# Patient Record
Sex: MALE | Race: WHITE | NOT HISPANIC OR LATINO | Employment: FULL TIME | URBAN - METROPOLITAN AREA
[De-identification: names, ages, dates, MRNs, and addresses within clinical notes are randomized per-mention and may not be internally consistent; named-entity substitution may affect disease eponyms.]

---

## 2018-02-25 ENCOUNTER — OFFICE VISIT (OUTPATIENT)
Dept: URGENT CARE | Facility: PHYSICIAN GROUP | Age: 28
End: 2018-02-25
Payer: COMMERCIAL

## 2018-02-25 VITALS
HEIGHT: 74 IN | SYSTOLIC BLOOD PRESSURE: 128 MMHG | OXYGEN SATURATION: 97 % | TEMPERATURE: 98 F | RESPIRATION RATE: 14 BRPM | BODY MASS INDEX: 40.43 KG/M2 | DIASTOLIC BLOOD PRESSURE: 88 MMHG | HEART RATE: 87 BPM | WEIGHT: 315 LBS

## 2018-02-25 DIAGNOSIS — L03.211 FACIAL CELLULITIS: ICD-10-CM

## 2018-02-25 PROCEDURE — 99204 OFFICE O/P NEW MOD 45 MIN: CPT | Performed by: PHYSICIAN ASSISTANT

## 2018-02-25 RX ORDER — DOXYCYCLINE HYCLATE 100 MG
100 TABLET ORAL 2 TIMES DAILY
Qty: 14 TAB | Refills: 0 | Status: SHIPPED | OUTPATIENT
Start: 2018-02-25 | End: 2018-03-04

## 2018-02-25 ASSESSMENT — ENCOUNTER SYMPTOMS
NAUSEA: 0
VOMITING: 0
CHANGE IN BOWEL HABIT: 0
SENSORY CHANGE: 0
FACIAL SWELLING: 1
FATIGUE: 0
FOCAL WEAKNESS: 0
PALPITATIONS: 0
SHORTNESS OF BREATH: 0
VISUAL CHANGE: 0
SORE THROAT: 0
JOINT SWELLING: 0
NUMBNESS: 0
FEVER: 0
ABDOMINAL PAIN: 0
TINGLING: 0
COUGH: 0
SINUS PAIN: 0
MYALGIAS: 0
CHILLS: 0
HEADACHES: 0

## 2018-02-25 NOTE — PROGRESS NOTES
"Subjective:      Arpan Goncalves is a 27 y.o. male who presents with Facial Swelling (pressure swelling on the r side )            Facial Swelling   This is a new problem. The current episode started today (patient woke up with right maxillary swelling). The problem occurs constantly. The problem has been unchanged. Pertinent negatives include no abdominal pain, change in bowel habit, chest pain, chills, congestion, coughing, fatigue, fever, headaches, joint swelling, myalgias, nausea, numbness, rash, sore throat, visual change or vomiting. Nothing aggravates the symptoms. He has tried nothing for the symptoms.     History reviewed. No pertinent past medical history.  History reviewed. No pertinent surgical history.    History reviewed. No pertinent family history.    No Known Allergies    Medications, Allergies, and current problem list reviewed today in Epic      Review of Systems   Constitutional: Negative for chills, fatigue, fever and malaise/fatigue.   HENT: Positive for facial swelling. Negative for congestion, ear discharge, ear pain, sinus pain and sore throat.         No tooth pain    Respiratory: Negative for cough and shortness of breath.    Cardiovascular: Negative for chest pain, palpitations and leg swelling.   Gastrointestinal: Negative for abdominal pain, change in bowel habit, nausea and vomiting.   Musculoskeletal: Negative for joint swelling and myalgias.   Skin: Negative for rash.   Neurological: Negative for tingling, sensory change, focal weakness, numbness and headaches.     All other systems reviewed and are negative.        Objective:     /88   Pulse 87   Temp 36.7 °C (98 °F)   Resp 14   Ht 1.88 m (6' 2\")   Wt (!) 152.9 kg (337 lb)   SpO2 97%   BMI 43.27 kg/m²      Physical Exam   Constitutional: He is oriented to person, place, and time. He appears well-developed and well-nourished. No distress.   HENT:   Head: Normocephalic and atraumatic.       Right Ear: Tympanic " membrane, external ear and ear canal normal.   Left Ear: Tympanic membrane, external ear and ear canal normal.   Nose: Right sinus exhibits no maxillary sinus tenderness. Left sinus exhibits no maxillary sinus tenderness.   Mouth/Throat: Uvula is midline, oropharynx is clear and moist and mucous membranes are normal.   dilapidated dentition- multiple caries but no signs of abscess.   Neck: Neck supple.   Cardiovascular: Normal rate, regular rhythm and normal heart sounds.  Exam reveals no gallop and no friction rub.    No murmur heard.  Pulmonary/Chest: Effort normal and breath sounds normal. No respiratory distress. He has no wheezes. He has no rales.   Lymphadenopathy:     He has no cervical adenopathy.   Neurological: He is alert and oriented to person, place, and time. No cranial nerve deficit.   Psychiatric: He has a normal mood and affect. His behavior is normal. Judgment and thought content normal.               Assessment/Plan:     1. Facial cellulitis  - encouraged Ibuprofen or trial of Benadryl if symptoms worsen.  - doxycycline (VIBRAMYCIN) 100 MG Tab; Take 1 Tab by mouth 2 times a day for 7 days.  Dispense: 14 Tab; Refill: 0    Follow-up if any worsening or fever.     Differential diagnoses, Supportive care, and indications for immediate follow-up discussed with patient.   Instructed to return to clinic or nearest emergency department for any change in condition, further concerns, or worsening of symptoms.    The patient demonstrated a good understanding and agreed with the treatment plan.    Miriam Pearl P.A.-C.

## 2021-07-02 ENCOUNTER — TELEPHONE (OUTPATIENT)
Dept: SCHEDULING | Facility: IMAGING CENTER | Age: 31
End: 2021-07-02

## 2021-07-08 SDOH — ECONOMIC STABILITY: HOUSING INSECURITY
IN THE LAST 12 MONTHS, WAS THERE A TIME WHEN YOU DID NOT HAVE A STEADY PLACE TO SLEEP OR SLEPT IN A SHELTER (INCLUDING NOW)?: NO

## 2021-07-08 SDOH — HEALTH STABILITY: PHYSICAL HEALTH: ON AVERAGE, HOW MANY MINUTES DO YOU ENGAGE IN EXERCISE AT THIS LEVEL?: 60 MIN

## 2021-07-08 SDOH — ECONOMIC STABILITY: TRANSPORTATION INSECURITY
IN THE PAST 12 MONTHS, HAS LACK OF TRANSPORTATION KEPT YOU FROM MEETINGS, WORK, OR FROM GETTING THINGS NEEDED FOR DAILY LIVING?: NO

## 2021-07-08 SDOH — HEALTH STABILITY: PHYSICAL HEALTH: ON AVERAGE, HOW MANY DAYS PER WEEK DO YOU ENGAGE IN MODERATE TO STRENUOUS EXERCISE (LIKE A BRISK WALK)?: 4 DAYS

## 2021-07-08 SDOH — ECONOMIC STABILITY: TRANSPORTATION INSECURITY
IN THE PAST 12 MONTHS, HAS THE LACK OF TRANSPORTATION KEPT YOU FROM MEDICAL APPOINTMENTS OR FROM GETTING MEDICATIONS?: NO

## 2021-07-08 SDOH — ECONOMIC STABILITY: FOOD INSECURITY: WITHIN THE PAST 12 MONTHS, THE FOOD YOU BOUGHT JUST DIDN'T LAST AND YOU DIDN'T HAVE MONEY TO GET MORE.: NEVER TRUE

## 2021-07-08 SDOH — ECONOMIC STABILITY: INCOME INSECURITY: HOW HARD IS IT FOR YOU TO PAY FOR THE VERY BASICS LIKE FOOD, HOUSING, MEDICAL CARE, AND HEATING?: NOT HARD AT ALL

## 2021-07-08 SDOH — ECONOMIC STABILITY: FOOD INSECURITY: WITHIN THE PAST 12 MONTHS, YOU WORRIED THAT YOUR FOOD WOULD RUN OUT BEFORE YOU GOT MONEY TO BUY MORE.: NEVER TRUE

## 2021-07-08 SDOH — ECONOMIC STABILITY: HOUSING INSECURITY: IN THE LAST 12 MONTHS, HOW MANY PLACES HAVE YOU LIVED?: 2

## 2021-07-08 SDOH — ECONOMIC STABILITY: INCOME INSECURITY: IN THE LAST 12 MONTHS, WAS THERE A TIME WHEN YOU WERE NOT ABLE TO PAY THE MORTGAGE OR RENT ON TIME?: NO

## 2021-07-08 SDOH — HEALTH STABILITY: MENTAL HEALTH
STRESS IS WHEN SOMEONE FEELS TENSE, NERVOUS, ANXIOUS, OR CAN'T SLEEP AT NIGHT BECAUSE THEIR MIND IS TROUBLED. HOW STRESSED ARE YOU?: NOT AT ALL

## 2021-07-08 SDOH — ECONOMIC STABILITY: TRANSPORTATION INSECURITY
IN THE PAST 12 MONTHS, HAS LACK OF RELIABLE TRANSPORTATION KEPT YOU FROM MEDICAL APPOINTMENTS, MEETINGS, WORK OR FROM GETTING THINGS NEEDED FOR DAILY LIVING?: NO

## 2021-07-08 ASSESSMENT — SOCIAL DETERMINANTS OF HEALTH (SDOH)
HOW OFTEN DO YOU ATTENT MEETINGS OF THE CLUB OR ORGANIZATION YOU BELONG TO?: NEVER
HOW OFTEN DO YOU ATTEND CHURCH OR RELIGIOUS SERVICES?: NEVER
HOW OFTEN DO YOU ATTENT MEETINGS OF THE CLUB OR ORGANIZATION YOU BELONG TO?: NEVER
IN A TYPICAL WEEK, HOW MANY TIMES DO YOU TALK ON THE PHONE WITH FAMILY, FRIENDS, OR NEIGHBORS?: MORE THAN THREE TIMES A WEEK
HOW MANY DRINKS CONTAINING ALCOHOL DO YOU HAVE ON A TYPICAL DAY WHEN YOU ARE DRINKING: 1 OR 2
HOW HARD IS IT FOR YOU TO PAY FOR THE VERY BASICS LIKE FOOD, HOUSING, MEDICAL CARE, AND HEATING?: NOT HARD AT ALL
ARE YOU MARRIED, WIDOWED, DIVORCED, SEPARATED, NEVER MARRIED, OR LIVING WITH A PARTNER?: NEVER MARRIED
IN A TYPICAL WEEK, HOW MANY TIMES DO YOU TALK ON THE PHONE WITH FAMILY, FRIENDS, OR NEIGHBORS?: MORE THAN THREE TIMES A WEEK
DO YOU BELONG TO ANY CLUBS OR ORGANIZATIONS SUCH AS CHURCH GROUPS UNIONS, FRATERNAL OR ATHLETIC GROUPS, OR SCHOOL GROUPS?: NO
DO YOU BELONG TO ANY CLUBS OR ORGANIZATIONS SUCH AS CHURCH GROUPS UNIONS, FRATERNAL OR ATHLETIC GROUPS, OR SCHOOL GROUPS?: NO
WITHIN THE PAST 12 MONTHS, YOU WORRIED THAT YOUR FOOD WOULD RUN OUT BEFORE YOU GOT THE MONEY TO BUY MORE: NEVER TRUE
HOW OFTEN DO YOU HAVE SIX OR MORE DRINKS ON ONE OCCASION: LESS THAN MONTHLY
HOW OFTEN DO YOU HAVE A DRINK CONTAINING ALCOHOL: 2-3 TIMES A WEEK
HOW OFTEN DO YOU GET TOGETHER WITH FRIENDS OR RELATIVES?: THREE TIMES A WEEK
HOW OFTEN DO YOU ATTEND CHURCH OR RELIGIOUS SERVICES?: NEVER
HOW OFTEN DO YOU GET TOGETHER WITH FRIENDS OR RELATIVES?: THREE TIMES A WEEK
ARE YOU MARRIED, WIDOWED, DIVORCED, SEPARATED, NEVER MARRIED, OR LIVING WITH A PARTNER?: NEVER MARRIED

## 2021-07-08 ASSESSMENT — LIFESTYLE VARIABLES
HOW OFTEN DO YOU HAVE SIX OR MORE DRINKS ON ONE OCCASION: LESS THAN MONTHLY
HOW OFTEN DO YOU HAVE A DRINK CONTAINING ALCOHOL: 2-3 TIMES A WEEK
HOW MANY STANDARD DRINKS CONTAINING ALCOHOL DO YOU HAVE ON A TYPICAL DAY: 1 OR 2

## 2021-07-09 ENCOUNTER — OFFICE VISIT (OUTPATIENT)
Dept: MEDICAL GROUP | Facility: IMAGING CENTER | Age: 31
End: 2021-07-09
Payer: COMMERCIAL

## 2021-07-09 VITALS
BODY MASS INDEX: 40.43 KG/M2 | OXYGEN SATURATION: 99 % | HEART RATE: 89 BPM | RESPIRATION RATE: 16 BRPM | SYSTOLIC BLOOD PRESSURE: 128 MMHG | HEIGHT: 74 IN | TEMPERATURE: 96.5 F | WEIGHT: 315 LBS | DIASTOLIC BLOOD PRESSURE: 84 MMHG

## 2021-07-09 DIAGNOSIS — Z13.220 SCREENING CHOLESTEROL LEVEL: ICD-10-CM

## 2021-07-09 DIAGNOSIS — Z00.00 WELLNESS EXAMINATION: ICD-10-CM

## 2021-07-09 DIAGNOSIS — R63.8 UNABLE TO LOSE WEIGHT: ICD-10-CM

## 2021-07-09 DIAGNOSIS — Z79.899 ON PRE-EXPOSURE PROPHYLAXIS FOR HIV: ICD-10-CM

## 2021-07-09 DIAGNOSIS — R79.89 ELEVATED LIVER FUNCTION TESTS: ICD-10-CM

## 2021-07-09 PROCEDURE — 99385 PREV VISIT NEW AGE 18-39: CPT | Performed by: PHYSICIAN ASSISTANT

## 2021-07-09 RX ORDER — EMTRICITABINE AND TENOFOVIR DISOPROXIL FUMARATE 200; 300 MG/1; MG/1
1 TABLET, FILM COATED ORAL DAILY
COMMUNITY
Start: 2021-06-06 | End: 2021-09-09 | Stop reason: SDUPTHER

## 2021-07-09 ASSESSMENT — PATIENT HEALTH QUESTIONNAIRE - PHQ9: CLINICAL INTERPRETATION OF PHQ2 SCORE: 0

## 2021-07-09 ASSESSMENT — PAIN SCALES - GENERAL: PAINLEVEL: NO PAIN

## 2021-07-09 NOTE — ASSESSMENT & PLAN NOTE
Patient admits to difficulty losing weight.  She states he has been overweight his entire life.  He has tried multiple different diets including keto, low-carb, intermittent fasting, food logging with calories and calories out.

## 2021-07-09 NOTE — ASSESSMENT & PLAN NOTE
Noted in previous labs.  Patient states that he was told he likely has fatty liver.  He has never had an ultrasound.  He is not drink alcohol excessively.  He has around 3 drinks a week mostly on the weekends.

## 2021-07-09 NOTE — PATIENT INSTRUCTIONS
Once resulted, your lab/test/imaging results will show up automatically in your MyChart. Please wait for my interpretation and recommendations prior to viewing your results to avoid any unnecessary confusion or misinterpretation. I will address all of the lab values that I interpret as abnormal and message you accordingly on your MyChart. I will always send you a message on your labs even if they are normal. If you do not hear back from me within 5 business days after getting your labs drawn, then please send me a message on MyChart so I can obtain your labs (especially if you went to an outside lab - [facility], [facility], etc). If you have any additional questions or concerns beyond my interpretation of your results, please make an appointment with me to discuss in further detail.    Please only use the [employer] messaging system for questions regarding your most recent appointment or if advised to use otherwise (glucose or blood pressure reporting). If you have any new problems or concerns, you must make an appointment to discuss. This includes any referral requests.     Thank you,    Patricia Mercer PA-C ([place])  Physician Assistant Certified  [facility]

## 2021-07-09 NOTE — ASSESSMENT & PLAN NOTE
Patient on Truvada for the past month.  [facility] and [facility] labs including full STD panel and CMP.  Uses condoms during every sexual interaction.

## 2021-07-09 NOTE — PROGRESS NOTES
Subjective:     CC:    Chief Complaint   Patient presents with   • Establish Care       HISTORY OF THE PRESENT ILLNESS: Patient is a 30 y.o. male here to establish care.    On pre-exposure prophylaxis for HIV  Patient on Truvada for the past month.  He was prescribed at [facility] and had labs including full STD panel and CMP.  Uses condoms during every sexual interaction.    Elevated liver function tests  Noted in previous labs.  Patient states that he was told he likely has fatty liver.  He has never had an ultrasound.  He is not drink alcohol excessively.  He has around 3 drinks a week mostly on the weekends.    BMI 45.0-49.9, adult (HCC)  Patient admits to difficulty losing weight.  She states he has been overweight his entire life.  He has tried multiple different diets including keto, low-carb, intermittent fasting, food logging with calories and calories out.      Depression Screening    Little interest or pleasure in doing things?  0 - not at all  Feeling down, depressed , or hopeless? 0 - not at all  Patient Health Questionnaire Score: 0    Allergies:   Patient has no known allergies.  Current Outpatient Medications Ordered in Epic   Medication Sig Dispense Refill   • emtricitabine-tenofovir (TRUVADA) 200-300 MG per tablet Take 1 tablet by mouth every day.       No current [facility]-ordered facility-administered medications on file.     History reviewed. No pertinent past medical history.  History reviewed. No pertinent surgical history.  Social History     Tobacco Use   • Smoking status: Never Smoker   • Smokeless tobacco: Never Used   Vaping Use   • Vaping Use: Never used   Substance Use Topics   • Alcohol use: Yes     Alcohol/week: 1.8 - 2.4 oz     Types: 3 - 4 Standard drinks or equivalent per week     Comment: occ   • Drug use: No     Social History     Social History Narrative   • Not on file     Family History   Problem Relation Age of Onset   • Cancer Maternal Grandfather         colon cancer  "      Health Maintenance/Screening  Reviewed.  Tdap next visit per patient request.     ROS:   Gen: no fevers/chills, no changes in weight  Eyes: no changes in vision  ENT: no sore throat, no hearing loss, no bloody nose  Pulm: no sob, no cough  CV: no chest pain, no palpitations  GI: no nausea/vomiting, no diarrhea  : no dysuria or hematuria  MSk: no myalgias  Skin: no rash  Neuro: no headaches, no numbness/tingling  Heme/Lymph: no easy bruising      Objective:     Exam: /84 (BP Location: Left arm, Patient Position: Sitting, BP Cuff Size: Adult)   Pulse 89   Temp 35.8 °C (96.5 °F) (Temporal)   Resp 16   Ht 1.88 m (6' 2\")   Wt (!) 166 kg (365 lb)   SpO2 99%  Body mass index is 46.86 kg/m².  General: Normal appearing. No distress.  HEENT: Normocephalic. Eyes conjunctiva clear lids without ptosis, pupils equal.  Neck: Supple without JVD or bruit. Thyroid is not enlarged.  Pulmonary: Clear to ausculation.  Normal effort. No rales, ronchi, or wheezing.  Cardiovascular: Regular rate and rhythm without murmur. Carotid and radial pulses are intact and equal bilaterally.  Abdomen: Soft, nontender, nondistended. Normal bowel sounds. Liver and spleen are not palpable.  Obese.  Neurologic: Grossly nonfocal  Lymph: No cervical or supraclavicular lymph nodes are palpable  Skin: Warm and dry.  No obvious lesions.  Musculoskeletal: Normal gait. No extremity cyanosis, clubbing, or edema.  Psych: Normal mood and affect. Alert and oriented x3. Judgment and insight is normal.    Assessment & Plan:   30 y.o. male with the following -    1. Wellness examination  Pleasant 22-year-old male here for annual wellness visit.  History reviewed.  Tdap next visit per patient request.  Previous labs reviewed.    2. On pre-exposure prophylaxis for HIV  Patient does not need refill today.  STD screening and creatinine up-to-date.    3. BMI 45.0-49.9, adult (HCC)  - Patient identified as having weight management issue.  Appropriate " orders and counseling given.  - REFERRAL TO [facility] IMPROVEMENT PROGRAMS (HIP); Future  - Lipid Profile; Future  - TSH; Future  - FREE THYROXINE; Future    4. Elevated liver function tests  Likely related to fatty liver.  Check ultrasound.  Recheck hepatic function panel next visit.  - US-ABDOMEN COMPLETE SURVEY; Future  - Lipid Profile; Future    5. Screening cholesterol level  - Lipid Profile; Future    6. Unable to lose weight  - TSH; Future  - FREE THYROXINE; Future    Return in about 2 months (around 9/9/2021) for screening labs, refill.    Patricia Mercer PA-C ([place])  Physician Assistant Certified  [facility]    Please note that this dictation was created using voice recognition software. I have made every reasonable attempt to correct obvious errors, but I expect that there are errors of grammar and possibly content that I did not discover before finalizing the note.

## 2021-07-16 ENCOUNTER — HOSPITAL ENCOUNTER (OUTPATIENT)
Dept: LAB | Facility: MEDICAL CENTER | Age: 31
End: 2021-07-16
Attending: PHYSICIAN ASSISTANT
Payer: COMMERCIAL

## 2021-07-16 DIAGNOSIS — R63.8 UNABLE TO LOSE WEIGHT: ICD-10-CM

## 2021-07-16 DIAGNOSIS — R79.89 ELEVATED LIVER FUNCTION TESTS: ICD-10-CM

## 2021-07-16 DIAGNOSIS — Z13.220 SCREENING CHOLESTEROL LEVEL: ICD-10-CM

## 2021-07-16 LAB
CHOLEST SERPL-MCNC: 132 MG/DL (ref 100–199)
FASTING STATUS PATIENT QL REPORTED: NORMAL
HDLC SERPL-MCNC: 35 MG/DL
LDLC SERPL CALC-MCNC: 71 MG/DL
T4 FREE SERPL-MCNC: 1.13 NG/DL (ref 0.93–1.7)
TRIGL SERPL-MCNC: 128 MG/DL (ref 0–149)
TSH SERPL DL<=0.005 MIU/L-ACNC: 2.12 UIU/ML (ref 0.38–5.33)

## 2021-07-16 PROCEDURE — 36415 COLL VENOUS BLD VENIPUNCTURE: CPT

## 2021-07-16 PROCEDURE — 84443 ASSAY THYROID STIM HORMONE: CPT

## 2021-07-16 PROCEDURE — 80061 LIPID PANEL: CPT

## 2021-07-16 PROCEDURE — 84439 ASSAY OF FREE THYROXINE: CPT

## 2021-09-09 ENCOUNTER — OFFICE VISIT (OUTPATIENT)
Dept: MEDICAL GROUP | Facility: IMAGING CENTER | Age: 31
End: 2021-09-09
Payer: COMMERCIAL

## 2021-09-09 VITALS
OXYGEN SATURATION: 96 % | SYSTOLIC BLOOD PRESSURE: 118 MMHG | WEIGHT: 315 LBS | TEMPERATURE: 97 F | DIASTOLIC BLOOD PRESSURE: 74 MMHG | HEART RATE: 91 BPM | RESPIRATION RATE: 14 BRPM | BODY MASS INDEX: 40.43 KG/M2 | HEIGHT: 74 IN

## 2021-09-09 DIAGNOSIS — Z23 NEED FOR VACCINATION: ICD-10-CM

## 2021-09-09 DIAGNOSIS — Z11.3 SCREENING EXAMINATION FOR SEXUALLY TRANSMITTED DISEASE: ICD-10-CM

## 2021-09-09 DIAGNOSIS — R63.4 WEIGHT LOSS: ICD-10-CM

## 2021-09-09 DIAGNOSIS — Z76.0 MEDICATION REFILL: ICD-10-CM

## 2021-09-09 DIAGNOSIS — R79.89 ELEVATED LIVER FUNCTION TESTS: ICD-10-CM

## 2021-09-09 DIAGNOSIS — Z79.899 ON PRE-EXPOSURE PROPHYLAXIS FOR HIV: ICD-10-CM

## 2021-09-09 PROCEDURE — 99214 OFFICE O/P EST MOD 30 MIN: CPT | Mod: 25 | Performed by: PHYSICIAN ASSISTANT

## 2021-09-09 PROCEDURE — 90471 IMMUNIZATION ADMIN: CPT | Performed by: PHYSICIAN ASSISTANT

## 2021-09-09 PROCEDURE — 90715 TDAP VACCINE 7 YRS/> IM: CPT | Performed by: PHYSICIAN ASSISTANT

## 2021-09-09 RX ORDER — EMTRICITABINE AND TENOFOVIR DISOPROXIL FUMARATE 200; 300 MG/1; MG/1
1 TABLET, FILM COATED ORAL DAILY
Qty: 90 TABLET | Refills: 0 | Status: SHIPPED | OUTPATIENT
Start: 2021-09-09 | End: 2021-12-02

## 2021-09-09 NOTE — PATIENT INSTRUCTIONS
Once resulted, your lab/test/imaging results will show up automatically in your MyChart. Please wait for my interpretation and recommendations prior to viewing your results to avoid any unnecessary confusion or misinterpretation. I will address all of the lab values that I interpret as abnormal and message you accordingly on your MyChart. I will always send you a message on your labs even if they are normal. If you do not hear back from me within 5 business days after getting your labs drawn, then please send me a message on MyChart so I can obtain your labs (especially if you went to an outside lab - [facility], [facility], etc). If you have any additional questions or concerns beyond my interpretation of your results, please make an appointment with me to discuss in further detail.    Please only use the [employer] messaging system for questions regarding your most recent appointment or if advised to use otherwise (glucose or blood pressure reporting). If you have any new problems or concerns, you must make an appointment to discuss. This includes any referral requests.     Thank you,    Patricia Mercer PA-C ([place])  Physician Assistant Certified  [facility]

## 2021-09-09 NOTE — ASSESSMENT & PLAN NOTE
Tolerating Truvada.  Patient not currently sexually active.  When he is he wears condoms.  Needs a refill today and updated labs.

## 2021-09-09 NOTE — ASSESSMENT & PLAN NOTE
Intentional weight loss of 16 pounds since last visit 2 months ago.  Patient states that he has been following a meal plan of 1500 marge a day.  He has been working out 5 to 6 days a week.  He has been losing around 2 pounds a week.  He has not met with the nutritionist yet but plans on making an appointment.

## 2021-09-09 NOTE — PROGRESS NOTES
"Subjective:     CC:   Chief Complaint   Patient presents with   • Follow-Up     2 months.    • Lab Results       HPI:   Arpan presents today to follow-up on:    Weight loss  Intentional weight loss of 16 pounds since last visit 2 months ago.  Patient states that he has been following a meal plan of 1500 marge a day.  He has been working out 5 to 6 days a week.  He has been losing around 2 pounds a week.  He has not met with the nutritionist yet but plans on making an appointment.    On pre-exposure prophylaxis for HIV  Tolerating Truvada.  Patient not currently sexually active.  When he is he wears condoms.  Needs a refill today and updated labs.      History reviewed. No pertinent past medical history.  Social History     Tobacco Use   • Smoking status: Never Smoker   • Smokeless tobacco: Never Used   Vaping Use   • Vaping Use: Never used   Substance Use Topics   • Alcohol use: Yes     Alcohol/week: 1.8 - 2.4 oz     Types: 3 - 4 Standard drinks or equivalent per week     Comment: occ   • Drug use: No     Current Outpatient Medications Ordered in Epic   Medication Sig Dispense Refill   • emtricitabine-tenofovir (TRUVADA) 200-300 MG per tablet Take 1 Tablet by mouth every day for 90 days. 90 Tablet 0     No current Epic-ordered facility-administered medications on file.     Patient has no known allergies.    Health Maintenance: Completed.  Patient will plan on getting flu shot later this year.    ROS:  Gen: no fevers/chills, + changes in weight  Pulm: no sob, no cough  CV: no chest pain, no palpitations, no edema  GI: no nausea/vomiting, no diarrhea  Skin: no rash, no lesions  Neuro: no headaches, no numbness/tingling  Heme/Lymph: no easy bruising or bleeding  Objective:   Exam:  /74 (BP Location: Left arm, Patient Position: Sitting, BP Cuff Size: Adult)   Pulse 91   Temp 36.1 °C (97 °F) (Temporal)   Resp 14   Ht 1.88 m (6' 2\")   Wt (!) 158 kg (349 lb)   SpO2 96%   BMI 44.81 kg/m²    Body mass index is " 44.81 kg/m².    Gen: Alert and oriented, No apparent distress.  HEENT: Head atraumatic, normocephalic. Pupils equal and round.  Lungs: Normal effort, CTA bilaterally, no wheezes, rhonchi, or rales  CV: Regular rate and rhythm. No murmurs, rubs, or gallops.  Ext: No clubbing, cyanosis, edema.    Assessment & Plan:     30 y.o. male with the following -     1. On pre-exposure prophylaxis for HIV  We will refill Truvada today.  Patient requesting 90 tablets with 0 refills.  Gave order for labs.  He has an appointment with the lab tomorrow.  - emtricitabine-tenofovir (TRUVADA) 200-300 MG per tablet; Take 1 Tablet by mouth every day for 90 days.  Dispense: 90 Tablet; Refill: 0  - Chlamydia/GC PCR Urine Or Swab; Future  - HEPATITIS PANEL ACUTE(4 COMPONENTS); Future  - HIV AG/AB COMBO ASSAY SCREENING; Future  - T.PALLIDUM AB EIA; Future    2. Screening examination for sexually transmitted disease  - Chlamydia/GC PCR Urine Or Swab; Future  - HEPATITIS PANEL ACUTE(4 COMPONENTS); Future  - HIV AG/AB COMBO ASSAY SCREENING; Future  - T.PALLIDUM AB EIA; Future    3. Elevated liver function tests  Noted in previous labs.  Recheck.  If remains elevated would recommend abdominal ultrasound be performed.  - Comp Metabolic Panel; Future    4. Weight loss  Intentional, continue diet and exercise.    5. Medication refill  - emtricitabine-tenofovir (TRUVADA) 200-300 MG per tablet; Take 1 Tablet by mouth every day for 90 days.  Dispense: 90 Tablet; Refill: 0    6. Need for vaccination  - Tdap Vaccine =>8YO IM    Return in about 3 months (around 12/9/2021) for truvada refill.    Patricia Mercer PA-C ([place])  Physician Assistant Certified  [facility]    Please note that this dictation was created using voice recognition software. I have made every reasonable attempt to correct obvious errors, but I expect that there are errors of grammar and possibly content that I did not discover before finalizing the note.

## 2021-09-10 ENCOUNTER — HOSPITAL ENCOUNTER (OUTPATIENT)
Dept: LAB | Facility: MEDICAL CENTER | Age: 31
End: 2021-09-10
Attending: PHYSICIAN ASSISTANT
Payer: COMMERCIAL

## 2021-09-10 DIAGNOSIS — Z79.899 ON PRE-EXPOSURE PROPHYLAXIS FOR HIV: ICD-10-CM

## 2021-09-10 DIAGNOSIS — Z11.3 SCREENING EXAMINATION FOR SEXUALLY TRANSMITTED DISEASE: ICD-10-CM

## 2021-09-10 DIAGNOSIS — R79.89 ELEVATED LIVER FUNCTION TESTS: ICD-10-CM

## 2021-09-10 LAB
ALBUMIN SERPL BCP-MCNC: 4 G/DL (ref 3.2–4.9)
ALBUMIN/GLOB SERPL: 1.5 G/DL
ALP SERPL-CCNC: 62 U/L (ref 30–99)
ALT SERPL-CCNC: 109 U/L (ref 2–50)
ANION GAP SERPL CALC-SCNC: 14 MMOL/L (ref 7–16)
AST SERPL-CCNC: 45 U/L (ref 12–45)
BILIRUB SERPL-MCNC: 0.7 MG/DL (ref 0.1–1.5)
BUN SERPL-MCNC: 8 MG/DL (ref 8–22)
CALCIUM SERPL-MCNC: 9.4 MG/DL (ref 8.4–10.2)
CHLORIDE SERPL-SCNC: 101 MMOL/L (ref 96–112)
CO2 SERPL-SCNC: 24 MMOL/L (ref 20–33)
CREAT SERPL-MCNC: 0.87 MG/DL (ref 0.5–1.4)
GLOBULIN SER CALC-MCNC: 2.7 G/DL (ref 1.9–3.5)
GLUCOSE SERPL-MCNC: 95 MG/DL (ref 65–99)
POTASSIUM SERPL-SCNC: 4.1 MMOL/L (ref 3.6–5.5)
PROT SERPL-MCNC: 6.7 G/DL (ref 6–8.2)
SODIUM SERPL-SCNC: 139 MMOL/L (ref 135–145)

## 2021-09-10 PROCEDURE — 87591 N.GONORRHOEAE DNA AMP PROB: CPT

## 2021-09-10 PROCEDURE — 80074 ACUTE HEPATITIS PANEL: CPT

## 2021-09-10 PROCEDURE — 86780 TREPONEMA PALLIDUM: CPT

## 2021-09-10 PROCEDURE — 87389 HIV-1 AG W/HIV-1&-2 AB AG IA: CPT

## 2021-09-10 PROCEDURE — 87491 CHLMYD TRACH DNA AMP PROBE: CPT

## 2021-09-10 PROCEDURE — 36415 COLL VENOUS BLD VENIPUNCTURE: CPT

## 2021-09-10 PROCEDURE — 80053 COMPREHEN METABOLIC PANEL: CPT

## 2021-09-11 LAB
C TRACH DNA SPEC QL NAA+PROBE: NEGATIVE
HAV IGM SERPL QL IA: NORMAL
HBV CORE IGM SER QL: NORMAL
HBV SURFACE AG SER QL: NORMAL
HCV AB SER QL: NORMAL
HIV 1+2 AB+HIV1 P24 AG SERPL QL IA: NORMAL
N GONORRHOEA DNA SPEC QL NAA+PROBE: NEGATIVE
SPECIMEN SOURCE: NORMAL
TREPONEMA PALLIDUM IGG+IGM AB [PRESENCE] IN SERUM OR PLASMA BY IMMUNOASSAY: NORMAL

## 2021-09-13 DIAGNOSIS — R79.89 ELEVATED LIVER FUNCTION TESTS: ICD-10-CM
